# Patient Record
(demographics unavailable — no encounter records)

---

## 2024-12-04 NOTE — ASSESSMENT
[FreeTextEntry1] : Patient's current symptoms are most likely from his fissure.  However, his hemorrhoids may be contributing.  I discussed the options of office Botox injections, hemorrhoidectomy with Botox in the operating room, and hemorrhoidectomy with fissurectomy and partial lateral internal sphincterotomy in the operating room.  Indications, risks, benefits, alternatives including but not limited to differences in healing rate, differences in risk of incontinence, bleeding, infection, recurrence, stenosis, tags, pain, and fistula were discussed.  The patient's wife was present for the conversation and all questions were answered.  The patient would like to proceed with office-based Botox and if that is unsuccessful then he will likely schedule hemorrhoidectomy with fissurectomy and partial lateral internal sphincterotomy.

## 2024-12-04 NOTE — HISTORY OF PRESENT ILLNESS
[FreeTextEntry1] : Pako is a 65 y/o male being seen for a follow-up visit, reexamine fissure  Colonoscopy 10/13/17 - One 3 mm polyp in the rectum. Internal hemorrhoids.  Last seen 09/11/24 - I have seen and evaluated patient and I have corroborated all nursing input into this note. Patient has perianal burning and pain. It is not fully clear if his symptoms are primarily from his fissure, his hemorrhoids, or both of these problems. However, my suspicion is that the fissure is the primary source of his symptoms. I prescribed topical diltiazem. If that is unsuccessful then Botox injections will be performed. Ultimately, if the patient continues to be symptomatic then surgery will be needed to address the combined fissure and hemorrhoid pathology.  Today pt reports intermittent anal discomfort and itchiness, Diltiazem with relief.   Formed BMs daily, very rare straining, no bleeding.   No episodes of incontinence of stool or flatus.  Good appetite.  No c/o nausea/vomiting.  Denies fever and chills.  Not on anticoagulants.

## 2025-01-22 NOTE — HISTORY OF PRESENT ILLNESS
[FreeTextEntry1] : Pako is a 65 y/o male being seen for a follow-up visit, botox injection  Colonoscopy 10/13/17 - One 3 mm polyp in the rectum. Internal hemorrhoids.  Last seen 12/4/24 - Patient's current symptoms are most likely from his fissure. However, his hemorrhoids may be contributing. I discussed the options of office Botox injections, hemorrhoidectomy with Botox in the operating room, and hemorrhoidectomy with fissurectomy and partial lateral internal sphincterotomy in the operating room. Indications, risks, benefits, alternatives including but not limited to differences in healing rate, differences in risk of incontinence, bleeding, infection, recurrence, stenosis, tags, pain, and fistula were discussed. The patient's wife was present for the conversation and all questions were answered. The patient would like to proceed with office-based Botox and if that is unsuccessful then he will likely schedule hemorrhoidectomy with fissurectomy and partial lateral internal sphincterotomy.   Today pt reports some itchiness in area. Daily BMs, formed, no straining, no routine use of fiber supplements/laxatives, denies pain, no bleeding, no episodes of incontinence, and denies feeling swollen or prolapsed tissue. Stopped using diltiazem cream as pt felt it "re-aggravated" the area. Also occasionally uses hydrocortisone cream.   Botox:  Lot #: [id number] Exp Date: 2027/02

## 2025-01-22 NOTE — PHYSICAL EXAM
[FreeTextEntry1] : External hemorrhoids with hemorrhoid prolapse.  Posterior fissure with exposed internal sphincter.

## 2025-01-22 NOTE — HISTORY OF PRESENT ILLNESS
[FreeTextEntry1] : Pako is a 63 y/o male being seen for a follow-up visit, botox injection  Colonoscopy 10/13/17 - One 3 mm polyp in the rectum. Internal hemorrhoids.  Last seen 12/4/24 - Patient's current symptoms are most likely from his fissure. However, his hemorrhoids may be contributing. I discussed the options of office Botox injections, hemorrhoidectomy with Botox in the operating room, and hemorrhoidectomy with fissurectomy and partial lateral internal sphincterotomy in the operating room. Indications, risks, benefits, alternatives including but not limited to differences in healing rate, differences in risk of incontinence, bleeding, infection, recurrence, stenosis, tags, pain, and fistula were discussed. The patient's wife was present for the conversation and all questions were answered. The patient would like to proceed with office-based Botox and if that is unsuccessful then he will likely schedule hemorrhoidectomy with fissurectomy and partial lateral internal sphincterotomy.   Today pt reports some itchiness in area. Daily BMs, formed, no straining, no routine use of fiber supplements/laxatives, denies pain, no bleeding, no episodes of incontinence, and denies feeling swollen or prolapsed tissue. Stopped using diltiazem cream as pt felt it "re-aggravated" the area. Also occasionally uses hydrocortisone cream.   Botox:  Lot #: [id number] Exp Date: 2027/02

## 2025-01-22 NOTE — ASSESSMENT
[FreeTextEntry1] : 50 units of Botox injected into internal sphincter on each side of fissure for total of 100 units.  No sedation required.  Follow-up 3 to 4 weeks.  If patient's symptoms continue then hemorrhoidectomy with partial lateral internal sphincterotomy will be needed.

## 2025-02-20 NOTE — ASSESSMENT
[FreeTextEntry1] : Fissure nearly healed.  Silvadene for perineal excoriations with absorbent cottonball between buttocks.  Follow-up as needed.

## 2025-02-20 NOTE — HISTORY OF PRESENT ILLNESS
[FreeTextEntry1] : Pako is a 63 y/o male being seen for a follow-up visit, botox injection  Colonoscopy 10/13/17 - One 3 mm polyp in the rectum. Internal hemorrhoids.  s/p Botox Injection 1/22/25   Colonoscopy 01/2025 by Dr. Eloy Monsalve pt reports he had polyp, removed.    Last seen 1/22/25 - 50 units of Botox injected into internal sphincter on each side of fissure for total of 100 units. No sedation required. Follow-up 3 to 4 weeks. If patient's symptoms continue then hemorrhoidectomy with partial lateral internal sphincterotomy will be needed.  Today pt reports feeling no pain.  Formed BMs daily, no straining, pain or bleeding.  Does feel swelling tissues.   No incontinence of stool or flatus.  Patient endorses having rashes in his buttocks by the end of January, using Terbinafine antifungal cream prescribed by his Dermatologist.  Good appetite.  No c/o nausea or vomiting.  Denies fever and chills.  Patient is on aspirin h/o noncompaction cardiomyopathy s/p defibrillator insertion.